# Patient Record
Sex: FEMALE | Race: WHITE | NOT HISPANIC OR LATINO | URBAN - METROPOLITAN AREA
[De-identification: names, ages, dates, MRNs, and addresses within clinical notes are randomized per-mention and may not be internally consistent; named-entity substitution may affect disease eponyms.]

---

## 2017-03-24 ENCOUNTER — FOLLOW UP ESTABLISHED (OUTPATIENT)
Dept: URBAN - METROPOLITAN AREA CLINIC 44 | Facility: CLINIC | Age: 72
End: 2017-03-24
Payer: MEDICARE

## 2017-03-24 PROCEDURE — 92012 INTRM OPH EXAM EST PATIENT: CPT | Performed by: OPHTHALMOLOGY

## 2017-03-24 ASSESSMENT — INTRAOCULAR PRESSURE
OS: 15
OD: 15

## 2017-07-18 ENCOUNTER — FOLLOW UP ESTABLISHED (OUTPATIENT)
Dept: URBAN - METROPOLITAN AREA CLINIC 44 | Facility: CLINIC | Age: 72
End: 2017-07-18
Payer: MEDICARE

## 2017-07-18 DIAGNOSIS — H40.013 OPEN ANGLE WITH BORDERLINE FINDINGS, LOW RISK, BILATERAL: Primary | ICD-10-CM

## 2017-07-18 PROCEDURE — 92083 EXTENDED VISUAL FIELD XM: CPT | Performed by: OPHTHALMOLOGY

## 2017-07-18 PROCEDURE — 92014 COMPRE OPH EXAM EST PT 1/>: CPT | Performed by: OPHTHALMOLOGY

## 2017-07-18 ASSESSMENT — INTRAOCULAR PRESSURE
OS: 10
OD: 11
OD: 13
OS: 14

## 2017-07-18 ASSESSMENT — VISUAL ACUITY
OD: 20/30
OS: 20/30

## 2020-07-22 ENCOUNTER — NEW PATIENT (OUTPATIENT)
Dept: URBAN - METROPOLITAN AREA CLINIC 44 | Facility: CLINIC | Age: 75
End: 2020-07-22
Payer: MEDICARE

## 2020-07-22 DIAGNOSIS — H26.493 OTHER SECONDARY CATARACT, BILATERAL: ICD-10-CM

## 2020-07-22 PROCEDURE — 92133 CPTRZD OPH DX IMG PST SGM ON: CPT | Performed by: OPTOMETRIST

## 2020-07-22 PROCEDURE — 92004 COMPRE OPH EXAM NEW PT 1/>: CPT | Performed by: OPTOMETRIST

## 2020-07-22 PROCEDURE — 92134 CPTRZ OPH DX IMG PST SGM RTA: CPT | Performed by: OPTOMETRIST

## 2020-07-22 ASSESSMENT — VISUAL ACUITY
OD: 20/40
OS: 20/40

## 2020-07-22 ASSESSMENT — KERATOMETRY
OS: 41.75
OD: 41.13

## 2020-07-22 ASSESSMENT — INTRAOCULAR PRESSURE
OS: 10
OD: 10

## 2020-10-22 ENCOUNTER — FOLLOW UP ESTABLISHED (OUTPATIENT)
Dept: URBAN - METROPOLITAN AREA CLINIC 44 | Facility: CLINIC | Age: 75
End: 2020-10-22
Payer: MEDICARE

## 2020-10-22 PROCEDURE — 92012 INTRM OPH EXAM EST PATIENT: CPT | Performed by: OPTOMETRIST

## 2020-10-22 PROCEDURE — 92083 EXTENDED VISUAL FIELD XM: CPT | Performed by: OPTOMETRIST

## 2020-10-22 ASSESSMENT — INTRAOCULAR PRESSURE
OD: 10
OS: 10

## 2021-04-21 ENCOUNTER — OFFICE VISIT (OUTPATIENT)
Dept: URBAN - METROPOLITAN AREA CLINIC 44 | Facility: CLINIC | Age: 76
End: 2021-04-21
Payer: MEDICARE

## 2021-04-21 DIAGNOSIS — H43.813 VITREOUS DEGENERATION, BILATERAL: ICD-10-CM

## 2021-04-21 DIAGNOSIS — H02.834 DERMATOCHALASIS OF LEFT UPPER EYELID: ICD-10-CM

## 2021-04-21 DIAGNOSIS — H02.831 DERMATOCHALASIS OF RIGHT UPPER EYELID: ICD-10-CM

## 2021-04-21 PROCEDURE — 99214 OFFICE O/P EST MOD 30 MIN: CPT | Performed by: OPTOMETRIST

## 2021-04-21 ASSESSMENT — VISUAL ACUITY
OD: 20/40
OS: 20/40

## 2021-04-21 ASSESSMENT — KERATOMETRY
OS: 41.38
OD: 41.00

## 2021-04-21 ASSESSMENT — INTRAOCULAR PRESSURE
OD: 10
OS: 11

## 2021-04-21 NOTE — IMPRESSION/PLAN
Impression: Dermatochalasis of right upper eyelid: H02.831. Plan: Bothersome. Schedule oculoplastic consult.

## 2021-04-21 NOTE — IMPRESSION/PLAN
Impression: Tear film insufficiency of bilateral lacrimal glands Plan: Vision may be reduced due to IVIS. Patient is very photosensitive. AT's q2h, night gel.

## 2021-04-21 NOTE — IMPRESSION/PLAN
Impression: Dermatochalasis of left upper eyelid: H02.834. Plan: Bothersome. Schedule oculoplastic consult.

## 2021-04-21 NOTE — IMPRESSION/PLAN
Impression: Open angle with borderline findings, low risk, bilateral Plan: Cupping OD>OS Family hx: sister +Fibromyalgia
+Lyphocytic colitis Pachymetry: [id number] IOP: 10/11 OCT RNFL (04/21/21): wnl OU
HVF (10/22/20): OD mild scatter (reliable) OS mild peripheral scatter (reliable) Did not start gtts today. OCT RNFL stable. Mos recent OCT wnl but worse than in 2015.   RTC 6 months for IOP + 24-2 HVF

## 2021-04-21 NOTE — IMPRESSION/PLAN
Impression: Other secondary cataract, bilateral Plan: No progression since last year. Likely minimal effect on vision. Do not recommend YAG at this time.

## 2021-05-20 ENCOUNTER — OFFICE VISIT (OUTPATIENT)
Dept: URBAN - METROPOLITAN AREA CLINIC 44 | Facility: CLINIC | Age: 76
End: 2021-05-20
Payer: MEDICARE

## 2021-05-20 DIAGNOSIS — H02.423 MYOGENIC PTOSIS OF BILATERAL EYELIDS: Primary | ICD-10-CM

## 2021-05-20 PROCEDURE — 92285 EXTERNAL OCULAR PHOTOGRAPHY: CPT | Performed by: OPHTHALMOLOGY

## 2021-05-20 PROCEDURE — 92081 LIMITED VISUAL FIELD XM: CPT | Performed by: OPHTHALMOLOGY

## 2021-05-20 PROCEDURE — 99204 OFFICE O/P NEW MOD 45 MIN: CPT | Performed by: OPHTHALMOLOGY

## 2021-05-20 RX ORDER — NEOMYCIN SULFATE, POLYMYXIN B SULFATE AND DEXAMETHASONE 3.5; 10000; 1 MG/G; [USP'U]/G; MG/G
OINTMENT OPHTHALMIC
Qty: 1 | Refills: 1 | Status: ACTIVE
Start: 2021-05-20

## 2021-05-20 NOTE — IMPRESSION/PLAN
Impression: Myogenic ptosis of bilateral eyelids: H02.423. Plan: [place] screen VF & photos were performed and interpreted today and demonstrated restriction of superior and temporal visual fields. This reverted to normal demonstrating >30% increase in visual field with mechanical elevation of the eyelid and brow. The patient's eyelids did not adequately respond to phenylephrine testing, indicating that the degree of ptosis will require an external levator resection to correct the eyelid ptosis. RBA were discussed with the patient and all questions were answered. Visual field shows restriction of superior and temporal visual fields in resting position. With eyelids/brows elevated/taped there is a significant (>30%) improvement in the superior and temporal visual fields. no skin removal, no fat removal OU.

## 2021-07-23 ENCOUNTER — OFFICE VISIT (OUTPATIENT)
Dept: URBAN - METROPOLITAN AREA CLINIC 44 | Facility: CLINIC | Age: 76
End: 2021-07-23
Payer: MEDICARE

## 2021-07-23 DIAGNOSIS — H04.123 TEAR FILM INSUFFICIENCY OF BILATERAL LACRIMAL GLANDS: ICD-10-CM

## 2021-07-23 DIAGNOSIS — H01.021 SQUAMOUS BLEPHARITIS OF RIGHT UPPER LID: Primary | ICD-10-CM

## 2021-07-23 PROCEDURE — 99214 OFFICE O/P EST MOD 30 MIN: CPT | Performed by: OPTOMETRIST

## 2021-07-23 RX ORDER — TRIAMCINOLONE ACETONIDE 1 MG/G
0.1 % CREAM TOPICAL
Qty: 15 | Refills: 0 | Status: ACTIVE
Start: 2021-07-23

## 2021-07-23 ASSESSMENT — INTRAOCULAR PRESSURE
OS: 10
OD: 10

## 2021-07-23 NOTE — IMPRESSION/PLAN
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 Plan: Mild inflammation. Start triamcinolone cream 0.1% TID to affected area x 1 week then stop. Cannot use steroid long term as it may thin the eye lid skin. RTC if symptoms do not improve.

## 2021-07-23 NOTE — IMPRESSION/PLAN
Impression: Tear film insufficiency of bilateral lacrimal glands Plan: Switch to preservative free Systane QID or more

## 2021-10-20 ENCOUNTER — OFFICE VISIT (OUTPATIENT)
Dept: URBAN - METROPOLITAN AREA CLINIC 44 | Facility: CLINIC | Age: 76
End: 2021-10-20
Payer: MEDICARE

## 2021-10-20 DIAGNOSIS — H01.024 SQUAMOUS BLEPHARITIS OF LEFT UPPER LID: ICD-10-CM

## 2021-10-20 PROCEDURE — 92083 EXTENDED VISUAL FIELD XM: CPT | Performed by: OPTOMETRIST

## 2021-10-20 PROCEDURE — 99213 OFFICE O/P EST LOW 20 MIN: CPT | Performed by: OPTOMETRIST

## 2021-10-20 ASSESSMENT — INTRAOCULAR PRESSURE
OD: 12
OS: 12

## 2021-10-20 NOTE — IMPRESSION/PLAN
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 Plan: Mild dermatitis BUL. Patient has basically had the same issue since last visit. It improved with triamcinolone cream but then recurred after discontinuing. Patient also has hx of eczema and dry, flaky skin on hands and on right arm. Patient can use triamcinolone cream QD or PRN for now but do not want to overuse due to risk of thinning eyelid skin.   Recommend PCP refer patient to dermatologist.

## 2021-10-20 NOTE — IMPRESSION/PLAN
Impression: Open angle with borderline findings, low risk, bilateral Plan: Cupping OD>OS Family hx: sister +Fibromyalgia
+Lyphocytic colitis Pachymetry: [id number] IOP: 12/12 OCT RNFL (04/21/21): wnl OU
HVF (10/20/21): OD non-specific defects (reliable) OS temporal defect (reliable) Did not start gtts today. HVF non-specific, but not worse than last year.   RTC 6 months for complete exam + possible OCT RNFL

## 2022-02-22 ENCOUNTER — OFFICE VISIT (OUTPATIENT)
Dept: URBAN - METROPOLITAN AREA CLINIC 44 | Facility: CLINIC | Age: 77
End: 2022-02-22
Payer: MEDICARE

## 2022-02-22 DIAGNOSIS — L30.9 DERMATITIS, UNSPECIFIED: Primary | ICD-10-CM

## 2022-02-22 DIAGNOSIS — H57.10 OCULAR PAIN, UNSPECIFIED EYE: ICD-10-CM

## 2022-02-22 PROCEDURE — 99214 OFFICE O/P EST MOD 30 MIN: CPT | Performed by: OPTOMETRIST

## 2022-02-22 RX ORDER — PREDNISOLONE ACETATE 10 MG/ML
1 % SUSPENSION/ DROPS OPHTHALMIC
Qty: 5 | Refills: 0 | Status: ACTIVE
Start: 2022-02-22

## 2022-02-22 ASSESSMENT — INTRAOCULAR PRESSURE
OD: 14
OS: 13

## 2022-02-22 NOTE — IMPRESSION/PLAN
Impression: Tear film insufficiency of bilateral lacrimal glands: H04.123. Clinical evaluation shows mild DED signs. Patient reports no or occasional symptoms not interfering with daily activities. Plan: PLAN: Recommend Lipid based tears to be used 4X daily. Rec 2000 mg Triglyceride based Omega 3 to be taken daily. Start Pred Forte 1 drop QID 2 weeks then QD 2 weeks and RTC 3-4 weeks for short or RTC if symptom's worsen.

## 2022-02-22 NOTE — IMPRESSION/PLAN
Impression: Dermatitis, unspecified: L30.9. Plan: PLAN: Discussed findings with patient. Rec Triamcinolone ointment BID OU for 1 week and then taper to QD OU for 1 week and stop then use Aquaphor BID OU for maintenance. RTC 3-4 weeks for short.

## 2022-02-22 NOTE — IMPRESSION/PLAN
Impression: Ocular pain, unspecified eye: H57.10. Ideology unknown likely neuropathy Plan: PLAN: Discussed findings and observe.

## 2022-03-17 ENCOUNTER — OFFICE VISIT (OUTPATIENT)
Dept: URBAN - METROPOLITAN AREA CLINIC 44 | Facility: CLINIC | Age: 77
End: 2022-03-17
Payer: MEDICARE

## 2022-03-17 PROCEDURE — 99212 OFFICE O/P EST SF 10 MIN: CPT | Performed by: OPTOMETRIST

## 2022-03-17 ASSESSMENT — INTRAOCULAR PRESSURE
OS: 14
OD: 12

## 2022-03-17 NOTE — IMPRESSION/PLAN
Impression: Dermatitis, unspecified: L30.9. Condition resolved/improved. Plan: PLAN: Discussed findings with patient. D/C Triamcinolone kayode. Continue Aquaphor PRN.  RTC PRN

## 2024-06-11 ENCOUNTER — OFFICE VISIT (OUTPATIENT)
Dept: URBAN - METROPOLITAN AREA CLINIC 44 | Facility: CLINIC | Age: 79
End: 2024-06-11
Payer: MEDICARE

## 2024-06-11 DIAGNOSIS — H26.493 OTHER SECONDARY CATARACT, BILATERAL: ICD-10-CM

## 2024-06-11 DIAGNOSIS — H02.423 MYOGENIC PTOSIS OF BILATERAL EYELIDS: ICD-10-CM

## 2024-06-11 DIAGNOSIS — H40.013 OPEN ANGLE WITH BORDERLINE FINDINGS, LOW RISK, BILATERAL: Primary | ICD-10-CM

## 2024-06-11 PROCEDURE — 92014 COMPRE OPH EXAM EST PT 1/>: CPT | Performed by: OPTOMETRIST

## 2024-06-11 ASSESSMENT — VISUAL ACUITY
OS: 20/40
OD: 20/40

## 2024-06-11 ASSESSMENT — INTRAOCULAR PRESSURE
OS: 11
OD: 11

## 2024-12-05 ENCOUNTER — OFFICE VISIT (OUTPATIENT)
Dept: URBAN - METROPOLITAN AREA CLINIC 44 | Facility: CLINIC | Age: 79
End: 2024-12-05
Payer: MEDICARE

## 2024-12-05 DIAGNOSIS — H02.423 MYOGENIC PTOSIS OF BILATERAL EYELIDS: ICD-10-CM

## 2024-12-05 DIAGNOSIS — H40.013 OPEN ANGLE WITH BORDERLINE FINDINGS, LOW RISK, BILATERAL: Primary | ICD-10-CM

## 2024-12-05 PROCEDURE — 99213 OFFICE O/P EST LOW 20 MIN: CPT | Performed by: OPTOMETRIST

## 2024-12-05 PROCEDURE — 92133 CPTRZD OPH DX IMG PST SGM ON: CPT | Performed by: OPTOMETRIST

## 2024-12-05 PROCEDURE — 92083 EXTENDED VISUAL FIELD XM: CPT | Performed by: OPTOMETRIST

## 2024-12-05 ASSESSMENT — INTRAOCULAR PRESSURE
OD: 11
OS: 11

## 2025-06-05 ENCOUNTER — OFFICE VISIT (OUTPATIENT)
Dept: URBAN - METROPOLITAN AREA CLINIC 44 | Facility: CLINIC | Age: 80
End: 2025-06-05
Payer: MEDICARE

## 2025-06-05 DIAGNOSIS — H26.493 OTHER SECONDARY CATARACT, BILATERAL: ICD-10-CM

## 2025-06-05 DIAGNOSIS — H40.013 OPEN ANGLE WITH BORDERLINE FINDINGS, LOW RISK, BILATERAL: Primary | ICD-10-CM

## 2025-06-05 DIAGNOSIS — H04.123 TEAR FILM INSUFFICIENCY OF BILATERAL LACRIMAL GLANDS: ICD-10-CM

## 2025-06-05 PROCEDURE — 92014 COMPRE OPH EXAM EST PT 1/>: CPT | Performed by: OPTOMETRIST

## 2025-06-05 ASSESSMENT — VISUAL ACUITY
OS: 20/40
OD: 20/40

## 2025-06-05 ASSESSMENT — KERATOMETRY
OD: 41.00
OS: 41.63

## 2025-06-05 ASSESSMENT — INTRAOCULAR PRESSURE
OD: 14
OS: 14